# Patient Record
Sex: FEMALE | Race: WHITE | NOT HISPANIC OR LATINO | Employment: UNEMPLOYED | URBAN - METROPOLITAN AREA
[De-identification: names, ages, dates, MRNs, and addresses within clinical notes are randomized per-mention and may not be internally consistent; named-entity substitution may affect disease eponyms.]

---

## 2023-07-13 PROBLEM — E78.5 DYSLIPIDEMIA: Status: ACTIVE | Noted: 2023-07-13

## 2024-10-18 ENCOUNTER — PATIENT ROUNDING (BHMG ONLY) (OUTPATIENT)
Dept: URGENT CARE | Facility: CLINIC | Age: 54
End: 2024-10-18
Payer: COMMERCIAL

## 2024-10-23 ENCOUNTER — OFFICE VISIT (OUTPATIENT)
Dept: FAMILY MEDICINE CLINIC | Facility: CLINIC | Age: 54
End: 2024-10-23
Payer: COMMERCIAL

## 2024-10-23 VITALS
SYSTOLIC BLOOD PRESSURE: 122 MMHG | OXYGEN SATURATION: 97 % | DIASTOLIC BLOOD PRESSURE: 75 MMHG | BODY MASS INDEX: 26.22 KG/M2 | WEIGHT: 148 LBS | HEIGHT: 63 IN | HEART RATE: 60 BPM

## 2024-10-23 DIAGNOSIS — Z12.31 ENCOUNTER FOR SCREENING MAMMOGRAM FOR MALIGNANT NEOPLASM OF BREAST: ICD-10-CM

## 2024-10-23 DIAGNOSIS — Z12.11 COLON CANCER SCREENING: ICD-10-CM

## 2024-10-23 DIAGNOSIS — Z78.0 POSTMENOPAUSAL: ICD-10-CM

## 2024-10-23 DIAGNOSIS — E78.5 DYSLIPIDEMIA: Primary | ICD-10-CM

## 2024-10-23 PROCEDURE — 99214 OFFICE O/P EST MOD 30 MIN: CPT | Performed by: FAMILY MEDICINE

## 2024-10-23 RX ORDER — CHLORAL HYDRATE 500 MG
1000 CAPSULE ORAL
COMMUNITY

## 2024-10-23 RX ORDER — GINGER ROOT/GINGER ROOT EXT 262.5 MG
1 CAPSULE ORAL DAILY
COMMUNITY

## 2024-10-23 NOTE — PROGRESS NOTES
Office Note     Name: Karuna Porter    : 1970     MRN: [id number]     Chief Complaint  New patient preventative medicine services (Establish care) and Referrals (Pt states she needs a couple of referrals )    Subjective     History of Present Illness:  Karuna Porter is a 54 y.o. female who presents today for follow-up.  She is also here today to establish care as her previous PCP left the practice.  She is originally from [place] but recently moved back to the area after being away for around 30 years.  She is currently not working.  She can lives by herself.    Patient does have a history of hyperlipidemia.  She is currently taking fish oil.  She is due for follow-up laboratory evaluation today.  Patient is status post hysterectomy and has not had a DEXA scan.  She would be interested in this today.  She does note that she takes calcium and vitamin D supplement and multivitamin.  She is due for a Cologuard update.  She would also like to schedule mammogram.  She notes that due to insurance coverage she did not have this done last year but is interested in getting this updated.    Patient does note a history of ovarian cancer in her mother.  She notes that she currently follows with  for regular transvaginal ultrasound.  She does have 1 right ovary.  She denies any family history of colon or breast cancer.  She does note that her father was recently diagnosed with prostate cancer and underwent radiation therapy and they tolerated well.  She does note that she had a hysterectomy related to a benign growth.  She does have a history of shingles but is up-to-date on vaccinations.  She follows regularly with an optometrist at [facility] who did diagnose her with early cataracts.  She follows regularly with a dentist Dr. Erika Alfaro.        Review of Systems:   Review of Systems   All other systems reviewed and are negative.      Past Medical History:   Past Medical History:   Diagnosis Date     Depression     Headache     Ovarian cyst     Visual impairment        Past Surgical History:   Past Surgical History:   Procedure Laterality Date    HYSTERECTOMY  2015    retains right ovary    INGUINAL HERNIA REPAIR Right 1978    LEFT OOPHORECTOMY Left 2008    approx date    LUMBAR LAMINECTOMY  2000    L4-L5       Family History:   Family History   Problem Relation Age of Onset    Ovarian cancer Mother     High cholesterol Mother     High cholesterol Father     Prostate cancer Paternal Grandfather        Social History:   Social History     Socioeconomic History    Marital status: Single   Tobacco Use    Smoking status: Never     Passive exposure: Never    Smokeless tobacco: Never   Vaping Use    Vaping status: Never Used   Substance and Sexual Activity    Alcohol use: Yes     Alcohol/week: 1.0 standard drink of alcohol     Types: 1 Glasses of wine per week    Drug use: Never    Sexual activity: Not Currently     Partners: Male     Birth control/protection: Hysterectomy       Immunizations:   Immunization History   Administered Date(s) Administered    COVID-19 (MODERNA) 1st,2nd,3rd Dose Monovalent 03/22/2021, 04/19/2021    COVID-19 (MODERNA) BIVALENT 12+YRS 12/20/2022    COVID-19 (PFIZER) Purple Cap Monovalent 12/16/2021    Flublok 18+yrs 09/09/2020, 10/05/2021, 10/12/2022    Fluzone  >6mos 09/25/2024    Fluzone (or Fluarix & Flulaval for VFC) >6mos 09/21/2015, 10/18/2018, 10/01/2019    Influenza Injectable Mdck Pf Quad 10/11/2023    Shingrix 07/23/2021, 10/20/2021    Tdap 02/18/2022        Medications:     Current Outpatient Medications:     Calcium Carb-Cholecalciferol (Calcium 600+D) 600-20 MG-MCG tablet, Take 1 tablet by mouth Daily., Disp: , Rfl:     multivitamin with minerals tablet tablet, Take 1 tablet by mouth Daily., Disp: , Rfl:     Omega-3 Fatty Acids (fish oil) 1000 MG capsule capsule, Take 1 capsule by mouth Daily With Breakfast., Disp: , Rfl:     Allergies:   No Known Allergies    Objective  "    Vital Signs  /75   Pulse 60   Ht 160 cm (62.99\")   Wt 67.1 kg (148 lb)   SpO2 97%   BMI 26.22 kg/m²   Estimated body mass index is 26.22 kg/m² as calculated from the following:    Height as of this encounter: 160 cm (62.99\").    Weight as of this encounter: 67.1 kg (148 lb).         Physical Exam  Vitals and nursing note reviewed.   Constitutional:       Appearance: Normal appearance. She is normal weight.   HENT:      Head: Normocephalic and atraumatic.      Nose: Nose normal.      Mouth/Throat:      Mouth: Mucous membranes are moist.   Eyes:      Extraocular Movements: Extraocular movements intact.      Pupils: Pupils are equal, round, and reactive to light.   Cardiovascular:      Rate and Rhythm: Normal rate.   Pulmonary:      Effort: Pulmonary effort is normal.   Abdominal:      General: Abdomen is flat.   Musculoskeletal:         General: Normal range of motion.      Cervical back: Normal range of motion.   Skin:     General: Skin is warm.   Neurological:      General: No focal deficit present.      Mental Status: She is alert and oriented to person, place, and time.   Psychiatric:         Mood and Affect: Mood normal.         Behavior: Behavior normal.         Thought Content: Thought content normal.         Judgment: Judgment normal.            Procedures     Results:  No results found for this or any previous visit (from the past 24 hours).     Assessment and Plan     Assessment/Plan:  Diagnoses and all orders for this visit:    1. Dyslipidemia (Primary)  Assessment & Plan:  Continue fish oil supplement will check fasting lipid panel today    Orders:  -     CBC (No Diff); Future  -     Lipid Panel; Future  -     Comprehensive Metabolic Panel; Future  -     TSH; Future    2. BMI 26.0-26.9,adult    3. Postmenopausal  Assessment & Plan:  Status post surgical removal of left ovary.  Will obtain DEXA scan screening today.    Orders:  -     DEXA Bone Density Axial; Future    4. Encounter for " screening mammogram for malignant neoplasm of breast  -     Mammo Screening Digital Tomosynthesis Bilateral With CAD; Future    5. Colon cancer screening  -     Cologuard - Stool, Per Rectum; Future        Follow Up  Return in about 6 months (around 4/23/2025) for Annual physical.    Lyudmila Muro DO   [facility]

## 2024-10-24 ENCOUNTER — LAB (OUTPATIENT)
Dept: LAB | Facility: HOSPITAL | Age: 54
End: 2024-10-24
Payer: COMMERCIAL

## 2024-10-24 DIAGNOSIS — E78.5 DYSLIPIDEMIA: ICD-10-CM

## 2024-10-24 LAB
ALBUMIN SERPL-MCNC: 4.5 G/DL (ref 3.5–5.2)
ALBUMIN/GLOB SERPL: 1.6 G/DL
ALP SERPL-CCNC: 79 U/L (ref 39–117)
ALT SERPL W P-5'-P-CCNC: 12 U/L (ref 1–33)
ANION GAP SERPL CALCULATED.3IONS-SCNC: 8.3 MMOL/L (ref 5–15)
AST SERPL-CCNC: 20 U/L (ref 1–32)
BILIRUB SERPL-MCNC: 0.3 MG/DL (ref 0–1.2)
BUN SERPL-MCNC: 11 MG/DL (ref 6–20)
BUN/CREAT SERPL: 12.4 (ref 7–25)
CALCIUM SPEC-SCNC: 9.7 MG/DL (ref 8.6–10.5)
CHLORIDE SERPL-SCNC: 102 MMOL/L (ref 98–107)
CHOLEST SERPL-MCNC: 240 MG/DL (ref 0–200)
CO2 SERPL-SCNC: 29.7 MMOL/L (ref 22–29)
CREAT SERPL-MCNC: 0.89 MG/DL (ref 0.57–1)
DEPRECATED RDW RBC AUTO: 42 FL (ref 37–54)
EGFRCR SERPLBLD CKD-EPI 2021: 76.2 ML/MIN/1.73
ERYTHROCYTE [DISTWIDTH] IN BLOOD BY AUTOMATED COUNT: 12.3 % (ref 12.3–15.4)
GLOBULIN UR ELPH-MCNC: 2.9 GM/DL
GLUCOSE SERPL-MCNC: 86 MG/DL (ref 65–99)
HCT VFR BLD AUTO: 39.5 % (ref 34–46.6)
HDLC SERPL-MCNC: 64 MG/DL (ref 40–60)
HGB BLD-MCNC: 13.1 G/DL (ref 12–15.9)
LDLC SERPL CALC-MCNC: 158 MG/DL (ref 0–100)
LDLC/HDLC SERPL: 2.43 {RATIO}
MCH RBC QN AUTO: 31.3 PG (ref 26.6–33)
MCHC RBC AUTO-ENTMCNC: 33.2 G/DL (ref 31.5–35.7)
MCV RBC AUTO: 94.3 FL (ref 79–97)
PLATELET # BLD AUTO: 261 10*3/MM3 (ref 140–450)
PMV BLD AUTO: 9.5 FL (ref 6–12)
POTASSIUM SERPL-SCNC: 4.5 MMOL/L (ref 3.5–5.2)
PROT SERPL-MCNC: 7.4 G/DL (ref 6–8.5)
RBC # BLD AUTO: 4.19 10*6/MM3 (ref 3.77–5.28)
SODIUM SERPL-SCNC: 140 MMOL/L (ref 136–145)
TRIGL SERPL-MCNC: 101 MG/DL (ref 0–150)
TSH SERPL DL<=0.05 MIU/L-ACNC: 2 UIU/ML (ref 0.27–4.2)
VLDLC SERPL-MCNC: 18 MG/DL (ref 5–40)
WBC NRBC COR # BLD AUTO: 4.47 10*3/MM3 (ref 3.4–10.8)

## 2024-10-24 PROCEDURE — 84443 ASSAY THYROID STIM HORMONE: CPT

## 2024-10-24 PROCEDURE — 80053 COMPREHEN METABOLIC PANEL: CPT

## 2024-10-24 PROCEDURE — 85027 COMPLETE CBC AUTOMATED: CPT

## 2024-10-24 PROCEDURE — 80061 LIPID PANEL: CPT

## 2024-11-05 ENCOUNTER — HOSPITAL ENCOUNTER (OUTPATIENT)
Dept: MAMMOGRAPHY | Facility: HOSPITAL | Age: 54
Discharge: HOME | End: 2024-11-05
Admitting: FAMILY MEDICINE
Payer: COMMERCIAL

## 2024-11-05 DIAGNOSIS — Z12.31 ENCOUNTER FOR SCREENING MAMMOGRAM FOR MALIGNANT NEOPLASM OF BREAST: ICD-10-CM

## 2024-11-05 PROCEDURE — 77067 SCR MAMMO BI INCL CAD: CPT

## 2024-11-05 PROCEDURE — 77063 BREAST TOMOSYNTHESIS BI: CPT

## 2025-04-23 ENCOUNTER — OFFICE VISIT (OUTPATIENT)
Dept: FAMILY MEDICINE CLINIC | Facility: CLINIC | Age: 55
End: 2025-04-23

## 2025-04-23 VITALS
OXYGEN SATURATION: 98 % | DIASTOLIC BLOOD PRESSURE: 78 MMHG | WEIGHT: 150.2 LBS | SYSTOLIC BLOOD PRESSURE: 130 MMHG | HEIGHT: 63 IN | BODY MASS INDEX: 26.61 KG/M2 | HEART RATE: 56 BPM

## 2025-04-23 DIAGNOSIS — Z00.00 ANNUAL PHYSICAL EXAM: Primary | ICD-10-CM

## 2025-04-23 DIAGNOSIS — E78.5 DYSLIPIDEMIA: ICD-10-CM

## 2025-04-23 PROCEDURE — 99396 PREV VISIT EST AGE 40-64: CPT | Performed by: FAMILY MEDICINE

## 2025-04-23 NOTE — ASSESSMENT & PLAN NOTE
Overall doing well.  Preventive screen discussed.  Immunizations reviewed.  DEXA scan ordered.  Diet and exercise recommendation given.

## 2025-04-23 NOTE — PROGRESS NOTES
Female Physical Note      Date: 2025   Patient Name: Karuna Porter  : 1970   MRN: [id number]     Chief Complaint:    Chief Complaint   Patient presents with    Annual Exam       History of Present Illness: Karuna Porter is a 55 y.o. female who is here today for their annual health maintenance and physical. She is originally from [place] but recently moved back to the area after being away for around 30 years.  She is currently not working.  She can lives by herself.     Patient does have a history of hyperlipidemia.  She is currently taking fish oil.  She declined statin therapy.      The 10-year ASCVD risk score (Maribeth DYER, et al., 2019) is: 2.6%    Values used to calculate the score:      Age: 55 years      Sex: Female      Is Non- : No      Diabetic: No      Tobacco smoker: No      Systolic Blood Pressure: 130 mmHg      Is BP treated: No      HDL Cholesterol: 64 mg/dL      Total Cholesterol: 240 mg/dL     Patient's Cologuard and mammogram are up-to-date.  Patient is status post hysterectomy and has not had a DEXA scan.  A referral has been placed but they were unable to reach patient to schedule.  She does note that she takes calcium and vitamin D supplement and multivitamin.  She declined a pneumonia vaccine today.    Patient does note a history of ovarian cancer in her mother.  She notes that she currently follows with  for regular transvaginal ultrasound.  She does have 1 right ovary.  She denies any family history of colon or breast cancer.  She does note that her father was recently diagnosed with prostate cancer and underwent radiation therapy and they tolerated well.  She does note that she had a hysterectomy related to a benign growth.  She does have a history of shingles but is up-to-date on vaccinations.  She follows regularly with an optometrist at [facility] who did diagnose her with early cataracts.  She follows regularly with a dentist   Erika Alfaro.      Subjective      Review of Systems:   Review of Systems   All other systems reviewed and are negative.      Past Medical History, Social History, Family History and Care Team were all reviewed with patient and updated as appropriate.     Medications:     Current Outpatient Medications:     Calcium Carb-Cholecalciferol (Calcium 600+D) 600-20 MG-MCG tablet, Take 1 tablet by mouth Daily., Disp: , Rfl:     multivitamin with minerals tablet tablet, Take 1 tablet by mouth Daily., Disp: , Rfl:     Omega-3 Fatty Acids (fish oil) 1000 MG capsule capsule, Take 1 capsule by mouth Daily With Breakfast., Disp: , Rfl:     Allergies:   No Known Allergies    Immunizations:  Td/Tdap(Booster Q 10 yrs):  UTD    Colorectal Screening:     Last Completed Colonoscopy            Upcoming       COLORECTAL CANCER SCREENING (COLOGUARD - Every 3 Years) Next due on 11/7/2027 11/07/2024  Cologuard component of Cologuard - Stool, Per Rectum    01/15/2020  COLOGUARD (Done)    01/06/2020  Cologuard - ,                           Pap:    Last Completed Pap Smear    This patient has no relevant Health Maintenance data.        Mammogram:    Last Completed Mammogram            Upcoming       MAMMOGRAM (Every 2 Years) Next due on 11/5/2026 11/05/2024  Mammo Screening Digital Tomosynthesis Bilateral With CAD    01/25/2022  MAMMO SCREENING DIGITAL TOMOSYNTHESIS BILATERAL W CAD    09/14/2020  MAMMO SCREENING DIGITAL TOMOSYNTHESIS BILATERAL W CAD    03/18/2019  MAMMO SCREENING DIGITAL TOMOSYNTHESIS BILATERAL W CAD    02/12/2018  MAMMO SCREENING DIGITAL TOMOSYNTHESIS BILATERAL W CAD     Only the first 5 history entries have been loaded, but more history exists.                          PHQ-2 Depression Screening  Little interest or pleasure in doing things? Not at all   Feeling down, depressed, or hopeless? Not at all   PHQ-2 Total Score 0         Objective     Physical Exam:  Vital Signs:   Vitals:    04/23/25 0822   BP: 130/78  "  Pulse: 56   SpO2: 98%   Weight: 68.1 kg (150 lb 3.2 oz)   Height: 160 cm (62.99\")          Physical Exam  Vitals and nursing note reviewed.   Constitutional:       Appearance: Normal appearance. She is normal weight.   HENT:      Head: Normocephalic and atraumatic.      Nose: Nose normal.      Mouth/Throat:      Mouth: Mucous membranes are moist.   Eyes:      Extraocular Movements: Extraocular movements intact.      Pupils: Pupils are equal, round, and reactive to light.   Cardiovascular:      Rate and Rhythm: Normal rate.   Pulmonary:      Effort: Pulmonary effort is normal.   Abdominal:      General: Abdomen is flat.   Musculoskeletal:         General: Normal range of motion.      Cervical back: Normal range of motion.   Skin:     General: Skin is warm.   Neurological:      General: No focal deficit present.      Mental Status: She is alert and oriented to person, place, and time.   Psychiatric:         Mood and Affect: Mood normal.         Behavior: Behavior normal.         Thought Content: Thought content normal.         Judgment: Judgment normal.         Procedures    Assessment / Plan      Assessment/Plan:   Diagnoses and all orders for this visit:    1. Annual physical exam (Primary)  Assessment & Plan:  Overall doing well.  Preventive screen discussed.  Immunizations reviewed.  DEXA scan ordered.  Diet and exercise recommendation given.    Orders:  -     CBC (No Diff); Future  -     Lipid Panel; Future  -     Comprehensive Metabolic Panel; Future  -     TSH; Future    2. BMI 26.0-26.9,adult    3. Dyslipidemia  Assessment & Plan:  Declined statin today.  Continue lifestyle modification          Follow Up:   Return in about 1 year (around 4/23/2026) for Annual physical.    Healthcare Maintenance:   Counseling provided on immunizations, preventative screenings, diet and exercise recommendations.   Karuna Porter voices understanding and acceptance of this advice and will call back with any further " questions or concerns. AVS with preventive healthcare tips printed for patient.     Lyudmila Muro DO   [facility] [place] [address]

## 2025-07-01 ENCOUNTER — APPOINTMENT (OUTPATIENT)
Facility: HOSPITAL | Age: 55
End: 2025-07-01
Payer: COMMERCIAL

## 2025-07-01 ENCOUNTER — HOSPITAL ENCOUNTER (EMERGENCY)
Facility: HOSPITAL | Age: 55
Discharge: ANOTHER HOSPITAL | End: 2025-07-01
Attending: EMERGENCY MEDICINE | Admitting: EMERGENCY MEDICINE
Payer: COMMERCIAL

## 2025-07-01 VITALS
BODY MASS INDEX: 25.69 KG/M2 | HEART RATE: 75 BPM | RESPIRATION RATE: 18 BRPM | HEIGHT: 63 IN | WEIGHT: 145 LBS | OXYGEN SATURATION: 97 % | TEMPERATURE: 98.3 F | SYSTOLIC BLOOD PRESSURE: 143 MMHG | DIASTOLIC BLOOD PRESSURE: 89 MMHG

## 2025-07-01 DIAGNOSIS — S09.90XA INJURY OF HEAD, INITIAL ENCOUNTER: ICD-10-CM

## 2025-07-01 DIAGNOSIS — S02.92XA MULTIPLE CLOSED FRACTURES OF FACIAL BONE, INITIAL ENCOUNTER: Primary | ICD-10-CM

## 2025-07-01 PROCEDURE — 70450 CT HEAD/BRAIN W/O DYE: CPT

## 2025-07-01 PROCEDURE — 99285 EMERGENCY DEPT VISIT HI MDM: CPT | Performed by: EMERGENCY MEDICINE

## 2025-07-01 PROCEDURE — 70486 CT MAXILLOFACIAL W/O DYE: CPT

## 2025-07-01 RX ORDER — ACETAMINOPHEN 325 MG/1
650 TABLET ORAL ONCE
Status: COMPLETED | OUTPATIENT
Start: 2025-07-01 | End: 2025-07-01

## 2025-07-01 RX ORDER — SODIUM CHLORIDE 0.9 % (FLUSH) 0.9 %
10 SYRINGE (ML) INJECTION AS NEEDED
Status: DISCONTINUED | OUTPATIENT
Start: 2025-07-01 | End: 2025-07-01 | Stop reason: HOSPADM

## 2025-07-01 RX ADMIN — ACETAMINOPHEN 650 MG: 325 TABLET ORAL at 12:22
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
